# Patient Record
Sex: FEMALE | Race: WHITE | NOT HISPANIC OR LATINO | URBAN - METROPOLITAN AREA
[De-identification: names, ages, dates, MRNs, and addresses within clinical notes are randomized per-mention and may not be internally consistent; named-entity substitution may affect disease eponyms.]

---

## 2023-11-02 ENCOUNTER — TELEPHONE (OUTPATIENT)
Dept: SPORTS MEDICINE | Facility: CLINIC | Age: 38
End: 2023-11-02

## 2024-01-24 NOTE — TELEPHONE ENCOUNTER
Patient called and left voicemail about being seen for her knee to possibly see if she is a candidate for injections. Could you please call her back to schedule her with Dr. Bragg for JEAN PAUL woodall? Thanks